# Patient Record
Sex: FEMALE | Race: WHITE | URBAN - METROPOLITAN AREA
[De-identification: names, ages, dates, MRNs, and addresses within clinical notes are randomized per-mention and may not be internally consistent; named-entity substitution may affect disease eponyms.]

---

## 2017-01-19 ENCOUNTER — TELEPHONE (OUTPATIENT)
Dept: ORTHOPEDICS | Facility: CLINIC | Age: 62
End: 2017-01-19

## 2017-01-19 DIAGNOSIS — M25.572 LEFT ANKLE PAIN, UNSPECIFIED CHRONICITY: Primary | ICD-10-CM

## 2017-01-19 NOTE — TELEPHONE ENCOUNTER
Patient called with a request for pain medication refill. She stated that she is still having a lot of pain, pain has not changed since she was here 4 weeks ago.  She has not had any new injury. She said she has gone to physical therapy twice and they have given her home exercises to work on, she will see them again on Monday. She stated that she is having swelling and that it hurts when she walks but that she will be working with physical therapy on Monday for gait training.  She was educated on the importance of weaning off the pain medication. Dr. Lord consulted and stated that he is okay with a refill of Tylenol #3 quantity of 20 and wants her to slow down. She was instructed on icing and elevating as well as the use of regular tylenol, she stated that she hasn't been icing because her physical therapist told her it wouldn't do her any good at this point.  She was encouraged to resume icing 20 minutes on and 40 minutes as much as she can throughout the day as well continuing the use of her compression stockings.  She verbalized understanding and is agreeable with plan. She will be obtaining x-rays in 2 weeks and sending to Dr. Lord. She was instructed to call back with worsening symptoms should they arise prior to her getting x-rays in 2 weeks.  Her prescription was sent to [facility] pharmacy in [place] per her request.

## 2017-02-01 ENCOUNTER — TELEPHONE (OUTPATIENT)
Dept: ORTHOPEDICS | Facility: CLINIC | Age: 62
End: 2017-02-01

## 2017-02-01 NOTE — TELEPHONE ENCOUNTER
----- Message from Vick López LPN sent at 2/1/2017 12:27 PM CST -----  Regarding: wants to know what Dr. Lord thinks of the x-ray that was done 01-  Contact: [phone]  Pt. Is calling wanting to know what the result's of the x-ray that was done on 01-.  Thanks  Vick/Darling

## 2017-02-01 NOTE — TELEPHONE ENCOUNTER
Patient notified that we do not have her x-rays in our system yet. Writer has contacted Komal Trevino to see if she could get the images pushed from patients clinic in [place]. Patient informed that worse case scenario, they will have to mail us a disc and it could be Friday or Monday before we have them in our system. She will be contacted with Dr. Lord's recommendations once we have the images for him to review.  Patient verbalized understanding and is agreeable with plan.

## 2017-02-07 NOTE — TELEPHONE ENCOUNTER
Patient notified that we received her x-rays today and Dr. Lord reviewed them stating that they look great. She has complete consolidation and should continue physical therapy without restrictions and work on stretching. She states she is still having some pain, she is encouraged to continue working with stretching/PT and to ice, with time her pain should continue to improve. Patient informed that she can follow up on a PRN basis at this point. Patient verbalized understanding and is agreeable with plan.